# Patient Record
Sex: MALE | Race: WHITE | Employment: PART TIME | URBAN - METROPOLITAN AREA
[De-identification: names, ages, dates, MRNs, and addresses within clinical notes are randomized per-mention and may not be internally consistent; named-entity substitution may affect disease eponyms.]

---
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
## 2018-04-05 PROBLEM — K59.00 CONSTIPATION: Status: ACTIVE | Noted: 2018-04-05

## 2018-04-09 ENCOUNTER — PATIENT OUTREACH (OUTPATIENT)
Dept: OTHER | Age: 18
End: 2018-04-09

## 2018-04-09 NOTE — PROGRESS NOTES
Telephonic MANUEL contact attempt for ROD Peterson. Following up with patient, following admission for constipation. Left discreet VM. CM will follow up.

## 2018-04-11 ENCOUNTER — PATIENT OUTREACH (OUTPATIENT)
Dept: OTHER | Age: 18
End: 2018-04-11

## 2018-04-11 NOTE — PROGRESS NOTES
MANUEL NOTE:     Mr. Seda Herring has been contacted regarding recent outpatient stay. Verified  and address for HIPAA security. Explained role and verified PCP. Standing medications were reviewed with the patient by telephone. * Mr. Wilbert Bowman is doing well following his GI cleanout.     - He has had a BM and was seen yesterday for FU. - Started on Linzess and has samples. * He is comfortable with his medical providers and attends appointments with no problems. * His mother is a physician and he does not see any needs for CM at this time. Date of  Discharge:       Facility patient visited:  [facility]   Reason for Visit:   Chronic constipation/ clean out. Reviewed scripts given at [place]? Yes - from OV 4/10     Able to obtain prescribed medication? Yes - starting with samples. How is the patient feeling? Pt stated he is feeling good. Does the patient have any questions or problems? Not at this time   Any barriers with transportation? no   Follow-up Appointment date: Yes - yesterday 4/10      Reviewed Discharge instructions: & Red Flags:  Per AVS.  Bowel obstruction;  GIB; Hemorrhoids. Provided patient with my name and contact information and instructed patient if there are any question to call. No further needs at this time. Will follow electronically for a month/ MANUEL    Chart Review:   FU apt 4/10    Assessment/ Plan:      ICD-10-CM ICD-9-CM     1. Constipation, unspecified constipation type K59.00 564.00 XR ABD (KUB)         We will do a Sitz marker Study,schedule him for a colonoscopy and also start him on Linzess 290 mcg. He will start the [address] [place] [address] after the Sitz Marker Study or unless he becomes too constipated. He received samples of 290 mcg and 145 mcg.      Follow-up Disposition:  Return in about 4 weeks (around 2018) for Post-procedure.   lab results and schedule of future lab studies reviewed with patient  reviewed diet, exercise and weight control  reviewed medications and side effects in detail

## 2018-05-11 ENCOUNTER — PATIENT OUTREACH (OUTPATIENT)
Dept: OTHER | Age: 18
End: 2018-05-11

## 2018-05-11 NOTE — PROGRESS NOTES
MANUEL  Wrap Up  Resolving current episode (Transitions of care complete). No further ED/UC or hospital admissions within 30 days post discharge. Patient attended follow-up appointments as directed. Final attempt to contact patient for transitions of care. Left discreet message on voicemail with this CM contact information. * Mr. Kamaljit Saenz has declined further CM in previous calls. No outreach from patient to [address] [place] [address].